# Patient Record
Sex: MALE | URBAN - NONMETROPOLITAN AREA
[De-identification: names, ages, dates, MRNs, and addresses within clinical notes are randomized per-mention and may not be internally consistent; named-entity substitution may affect disease eponyms.]

---

## 2019-01-03 ENCOUNTER — APPOINTMENT (RX ONLY)
Dept: URBAN - NONMETROPOLITAN AREA CLINIC 22 | Facility: CLINIC | Age: 44
Setting detail: DERMATOLOGY
End: 2019-01-03

## 2019-01-03 DIAGNOSIS — L21.8 OTHER SEBORRHEIC DERMATITIS: ICD-10-CM

## 2019-01-03 PROBLEM — F32.9 MAJOR DEPRESSIVE DISORDER, SINGLE EPISODE, UNSPECIFIED: Status: ACTIVE | Noted: 2019-01-03

## 2019-01-03 PROCEDURE — ? COUNSELING

## 2019-01-03 PROCEDURE — ? TREATMENT REGIMEN

## 2019-01-03 PROCEDURE — ? PRESCRIPTION

## 2019-01-03 PROCEDURE — 99202 OFFICE O/P NEW SF 15 MIN: CPT

## 2019-01-03 RX ORDER — KETOCONAZOLE 20.5 MG/ML
SHAMPOO, SUSPENSION TOPICAL QD
Qty: 1 | Refills: 6 | Status: ERX | COMMUNITY
Start: 2019-01-03

## 2019-01-03 RX ORDER — KETOCONAZOLE 20 MG/G
CREAM TOPICAL QD
Qty: 1 | Refills: 6 | Status: ERX | COMMUNITY
Start: 2019-01-03

## 2019-01-03 RX ADMIN — KETOCONAZOLE: 20.5 SHAMPOO, SUSPENSION TOPICAL at 00:00

## 2019-01-03 RX ADMIN — KETOCONAZOLE: 20 CREAM TOPICAL at 00:00

## 2019-01-03 NOTE — PROCEDURE: MIPS QUALITY
Quality 431: Preventive Care And Screening: Unhealthy Alcohol Use - Screening: Patient screened for unhealthy alcohol use using a single question and scores less than 2 times per year
Detail Level: Detailed
Quality 226: Preventive Care And Screening: Tobacco Use: Screening And Cessation Intervention: Patient screened for tobacco use and is an ex/non-smoker
Quality 131: Pain Assessment And Follow-Up: Pain assessment using a standardized tool is documented as negative, no follow-up plan required
Quality 130: Documentation Of Current Medications In The Medical Record: Current Medications Documented

## 2019-01-03 NOTE — PROCEDURE: COUNSELING
Patient Specific Counseling (Will Not Stick From Patient To Patient): Discussed sensitivity to yeast causing the rash. Explained that this yeast is always present on the skin. While the symptoms can be improved there is not a cure. recommend patient use ketoconazole cream and shampoo to manage symptoms and over the counter hydrocortisone during flare ups. Patient voiced understanding and has no further questions at this time.
Detail Level: Zone

## 2019-05-15 ENCOUNTER — APPOINTMENT (RX ONLY)
Dept: URBAN - NONMETROPOLITAN AREA CLINIC 22 | Facility: CLINIC | Age: 44
Setting detail: DERMATOLOGY
End: 2019-05-15

## 2019-05-15 DIAGNOSIS — L21.8 OTHER SEBORRHEIC DERMATITIS: ICD-10-CM

## 2019-05-15 PROBLEM — J45.909 UNSPECIFIED ASTHMA, UNCOMPLICATED: Status: ACTIVE | Noted: 2019-05-15

## 2019-05-15 PROBLEM — I10 ESSENTIAL (PRIMARY) HYPERTENSION: Status: ACTIVE | Noted: 2019-05-15

## 2019-05-15 PROCEDURE — ? TREATMENT REGIMEN

## 2019-05-15 PROCEDURE — ? PRESCRIPTION

## 2019-05-15 PROCEDURE — ? COUNSELING

## 2019-05-15 PROCEDURE — 99213 OFFICE O/P EST LOW 20 MIN: CPT

## 2019-05-15 RX ORDER — KETOCONAZOLE 20 MG/G
CREAM TOPICAL QD
Qty: 1 | Refills: 11 | Status: ERX | COMMUNITY
Start: 2019-05-15

## 2019-05-15 RX ORDER — TRIAMCINOLONE ACETONIDE 1 MG/G
CREAM TOPICAL
Qty: 1 | Refills: 2 | Status: ERX | COMMUNITY
Start: 2019-05-15

## 2019-05-15 RX ORDER — KETOCONAZOLE 20.5 MG/ML
SHAMPOO, SUSPENSION TOPICAL QD
Qty: 1 | Refills: 11 | Status: ERX

## 2019-05-15 RX ADMIN — TRIAMCINOLONE ACETONIDE: 1 CREAM TOPICAL at 18:43

## 2019-05-15 RX ADMIN — KETOCONAZOLE: 20 CREAM TOPICAL at 18:42

## 2019-05-15 ASSESSMENT — LOCATION DETAILED DESCRIPTION DERM
LOCATION DETAILED: LEFT MID TEMPLE
LOCATION DETAILED: LEFT POSTERIOR EAR
LOCATION DETAILED: RIGHT POSTERIOR EARLOBE
LOCATION DETAILED: RIGHT INFERIOR TEMPLE

## 2019-05-15 ASSESSMENT — LOCATION ZONE DERM
LOCATION ZONE: FACE
LOCATION ZONE: EAR

## 2019-05-15 ASSESSMENT — LOCATION SIMPLE DESCRIPTION DERM
LOCATION SIMPLE: LEFT EAR
LOCATION SIMPLE: RIGHT EAR
LOCATION SIMPLE: RIGHT TEMPLE
LOCATION SIMPLE: LEFT TEMPLE

## 2019-05-15 NOTE — PROCEDURE: COUNSELING
Detail Level: Zone
Patient Specific Counseling (Will Not Stick From Patient To Patient): Discussed sensitivity to yeast causing the rash. Explained that this yeast is always present on the skin. While the symptoms can be improved there is not a cure. recommend patient use ketoconazole cream and shampoo to manage symptoms and over the counter hydrocortisone during flare ups. Patient voiced understanding and has no further questions at this time.